# Patient Record
(demographics unavailable — no encounter records)

---

## 2017-03-03 NOTE — ED PEDIATRIC ILLNESS
HPI-Pediatric Illness


General


Chief Complaint:  Fever-Adult/Adol


Stated Complaint:  FEVER


Nursing Triage Note:  


MOM STATES CHILD HAS HAD FEVER SINCE MONDAY, WAS SEEN BY CHC YESTERDAY AND TOLD 


IF NO BETTER IN 24 HOUR TO COME TO ED.


Source:  patient


Exam Limitations:  no limitations


 (RHONA BRINK MD)





History of Present Illness


Time seen by provider:  20:56


Initial Comments


Here with report of fever over the last few days as well as moderate runny nose 

and cough.  Mother was concerned because the child hasn't eaten much over the 

last 2 days.  Child is drinking well.  Also has broken blood vessel on the 

lower portion of the left eye that they're concerned about.  Seen at the clinic 

yesterday.  Had one episode of vomiting yesterday but none today.  Child is 

drinking a moderate amount of fluid.  Has had 2 wet diapers today.


Timing/Duration:  changing over time, other (4 days)


Severity:  moderate


Associated Symptoms:   eating less fussy


Presenting Symptoms:   fever runny nose persistent cough vomitingNo skin rash (

RHONA BRINK MD)





Allergies and Home Medications


Allergies


Coded Allergies:  


     No Known Drug Allergies (Unverified , 14)





Home Medications


Cetirizine HCl 1 Mg/1 Ml Solution #75 (Reported) 


Ondansetron 4 Mg Tab.rapdis #5 2 MG PO Q6H PRN PRN NAUSEA/VOMITING 


   Prescribed by: OTONIEL GARCIA on 3/3/17 2234





Constitutional:   see HPINo chills, No fever


EENTM:   nose congestion


Respiratory:   coughNo short of breath


Cardiovascular:   no symptoms reported


Gastrointestinal:  No abdominal pain, No diarrhea,  vomiting


Genitourinary:   see HPI


Musculoskeletal:   no symptoms reported


Skin:   no symptoms reportedNo rash (RHONA BRINK MD)





All Other Systems Reviewed


Negative Unless Noted:  Yes


 (RHONA BRINK MD)





PMH-Pediatrics


Recent Foreign Travel:  No


Contact w/other who traveled:  No


Recent Infectious Disease Expo:  No


Hospitalization with Isolation:  Denies


 (RHONA BRINK MD)


Seasonal Allergies:  No


 (RHONA BRINK MD)


HX Surgeries:  No


 (RHONA BRIKN MD)


Hx Respiratory Disorders:  No


 (RHONA BRINK MD)


Hx Cardiovascular Disorders:  No


 (RHONA BRINK MD)


Hx Neurological Disorders:  No


 (RHONA BRINK MD)


Hx Reproductive Disorders:  No


 (RHONA BRINK MD)


Hx Genitourinary Disorders:  No


 (RHONA BRINK MD)


Hx Gastrointestinal Disorders:  No


 (RHONA BRINK MD)


Hx Musculoskeletal Disorders:  No


 (RHONA BRINK MD)


Hx Endocrine Disorders:  No


 (RHONA BRINK MD)


HX ENT Disorders:  No


 (RHONA BRINK MD)


Hx Cancer:  No


 (RHONA BRINK MD)


Hx Psychiatric Problems:  No


 (RHONA BRINK MD)


HX Skin/Integumentary Disorder:  No


 (RHONA BRINK MD)


Hx Blood Disorders:  No


 (RHONA BRINK MD)


Reviewed/Agree w Nursing PMH:  Yes


 (RHONA BRINK MD)


Significant Family History:  No Pertinent Family Hx


 (RHONA BRINK MD)





Physical Exam-Pediatric


Physical Exam


Vital Signs





 Vital Sign - Last 12Hours








 3/3/17 3/3/17





 19:27 22:55


 


Temp 101.3 


 


Pulse 162 


 


Resp 28 


 


B/P 0/0 


 


Pulse Ox  99





 (OTONIEL GARCIA)


Vital Signs


Capillary Refill :  


 (RHONA BRINK MD)


General Appearance:  no acute distress, fussy


General Appearance-Infants:  nml consolability


HENT:   TM redNo TM bulging, No loss of TM landmarks,  nasal congestion 

rhinorrhea


Neck:   full range of motion supple normal inspection


Respiratory:   lungs clear normal breath sounds


Cardiovascular:   no murmur tachycardia


Gastrointestinal:   non tender soft


Extremities:   non-tender normal inspection


Neurologic/Psychiatric:   alert oriented x 3


Skin:   normal color warm/dry (RHONA BRINK MD)





Progress/Results/Core Measures


Results/Orders


Micro Results





 Microbiology


3/3/17 Influenza Types A,B Antigen (ЕКАТЕРИНА) - Final, Complete


         


3/3/17 Respiratory Syncytial Virus Ag - Final, Complete


         


 (OTONIEL GARCIA)


Medications Given in ED


 (OTONIEL GARCIA)


Vital Signs/I&O





 Vital Sign - Last 12Hours








 3/3/17 3/3/17





 19:27 22:55


 


Temp 101.3 99.4


 


Pulse 162 112


 


Resp 28 28


 


B/P 0/0 


 


Pulse Ox  99





 (OTONIEL GARCIA)


Progress Note :  


Progress Note


Seen and evaluated.  RSV screen done.  This was positive.  Influenza negative.  

Child given ibuprofen and Tylenol.  She is tolerating by mouth fluids.  I did 

discuss with the mother related to need for fluids but mother states that she 

has been drinking very well.  We will avoid IV currently but I will get chest x-

ray given the length of time of symptoms.  Mother was in agreement of that.  

Child is drinking fluids currently.  Monitor patient.


 (RHONA BRINK MD)





Diagnostic Imaging





   Diagonstic Imaging:  Xray


   Plain Films/CT/US/NM/MRI:  chest


Comments


 [facility].


 [place], [place]





NAME:   DAINA ARROYO


MED REC#:   [id number]


ACCOUNT#:   [id number]


PT STATUS:   REG ER


:   2014


PHYSICIAN:   RHONA BRINK MD


ADMIT DATE:   17/ER


 ***Draft***


Date of Exam:17





CHEST PA/LAT (2 VIEW)














INDICATION: Fever.





COMPARISON: None.





EXAMINATION: Frontal and lateral views of the chest were


obtained.





FINDINGS: Normal heart size and pulmonary vascularity. The lungs


are clear. There are no signs of infiltrate, pleural effusions


or pneumothoraces. The visualized osseous structures show no


acute abnormalities.





IMPRESSION: No acute process. No signs of infiltrates, effusions


or pneumothoraces.





Dictated on workstation # [id number]








Dict:   175


Trans:   17


[facility] [id number]





Interpreted by:     TRINO MONTEZ


Electronically signed by:  





 (RHONA BRINK MD)





Departure


Impression


Impression:  


 Primary Impression:  


 RSV infection


Disposition:   HOME, SELF-CARE


Condition:  Improved





Departure-Patient Inst.


Decision time for Depature:  22:33


 (OTNOIEL GARCIA)


Referrals:  


FLORESITA PEARSON MD (PCP/Family)


Primary Care Physician


Patient Instructions:  Bronchiolitis (and RSV), Nausea and Vomiting, Child (DC)





Add. Discharge Instructions:


All discharge instructions reviewed with patient and/or family. Voiced 

understanding.  Medications as instructed.  Tylenol and ibuprofen over-the-

counter as directed based on weight/age.  Push fluids including clear liquids, 

popsicles, Jell-O, broth, Sprite/7-Up, etc.  Over-the-counter saline nasal 

spray if needed for nasal congestion.  Follow-up with your pediatrician for a 

recheck as an outpatient, call for appointment time Monday morning.  Return to 

the emergency department for worsened fever, decreased urination, vomiting, 

shortness of air, difficulty swallowing, or any other concerns.


Scripts


Ondansetron (Ondansetron Odt)4 Mg Tab.rapdis2 Mg PO Q6H PRN NAUSEA/VOMITING #5 

TAB  Ref 0


   Prov:OTONIEL GARCIA         3/3/17








RHONA BRINK MD Mar 3, 2017 21:14


OTONIEL GARCIA Mar 3, 2017 22:36


the emergency department for worsened fever, decreased urination, vomiting, 

shortness of air, difficulty swallowing, or any other concerns.


Scripts


Ondansetron (Ondansetron Odt)4 Mg Tab.rapdis2 Mg PO Q6H PRN NAUSEA/VOMITING #5 

TAB  Ref 0


   Prov:OTONIEL GARCIA         3/3/17








RHONA BRINK MD Mar 3, 2017 21:14


OTONIEL GARCIA Mar 3, 2017 22:36

## 2017-03-03 NOTE — XMS REPORT
Continuity of Care Document

 Created on: 10/31/2016



DAINA ARROYO

External Reference #: [id number]

: 2014

Sex: Female



Demographics







 Address  [address] [place]  [address]

 

 Home Phone  [phone]

 

 Preferred Language  English

 

 Marital Status  Unknown

 

 Roman [employer] Affiliation  Unknown

 

 Race  Unknown

 

 Ethnic Group  Unknown





Author







 Author  [facility]

 

 Organization  [facility]

 

 Address  Unknown

 

 Phone  Unavailable







Support







 Name  Relationship  Address  Phone

 

 FLORESITA PEARSON MD  Caregiver  [address] [place]  [address] [phone]

 

 JOYCE EMMIE FRAUSTO   Caregiver  [facility] EMERGENCY DEPT

[place], [place]  [address] [phone]

 

 AMEENA PASTRANA  Next Of Kin  [address] [place]  [address] [phone]







Care Team Providers







 Care Team Member Name  Role  Phone

 

 FLORESITA PEARSON MD  PCP  [phone]







Insurance Providers







 Payer Name  Policy Number  Subscriber Name  Relationship

 

 Self [employer] [facility]  [id number]  Daina Arroyo  18 Self / Same As 
Patient







Advance Directives







 Directive  Response  Recorded Date/Time

 

 Advance Directives  No  10/30/16 9:50pm

 

 Health Care Power of   No  10/30/16 9:50pm

 

 Organ Donor  No  10/30/16 9:50pm

 

 Resuscitation Status  Full Code  10/30/16 9:50pm







Chief Complaint and Reason for Visit







 Chief Complaint  Pediatric Illness/Problems

 

 Reason for Visit  Gastroenteritis







Problems

Active Problems







 Medical Problem  Onset Date  Status

 

 Fever  Unknown  Acute

 

 Gastroenteritis  Unknown  Acute

 

 Gastroenteritis  Unknown  Acute

 

 Influenza A  Unknown  Acute

 

 Influenza A  Unknown  Acute

 

 Left otitis media  Unknown  Acute

 

 Nausea and vomiting  Unknown  Acute

 

 Right otitis media  Unknown  Acute

 

 Upper respiratory infection  Unknown  Acute







Medications

Current Home Medications







 Medication  Dose  Units  Route  Directions  Days/Qty  Instructions  Start Date

 

 Ondansetron 4 Mg  2  Mg  Oral  Every 4HRS for Nausea/Vomiting  4     10/30/16





Past Home Medications







 Medication  Directions  Ordered  Status

 

 Ondansetron Hcl 4 Mg Tab, 2 Mg Sublingual  Every 4HRS  05/23/15  Discontinued

 

 Amoxicillin Trihydrate 125 Mg/5 Ml Susp.recon, 3 Ml Oral  Twice A Day  06/01/
15  Discontinued

 

 Lactobacillus Combo No.12 1 Each Powd.pack, 0.5 Each Oral  Daily  06/01/15  
Discontinued

 

 Amoxicillin 250 Mg/5 Ml Susp, 3 Ml Oral  Three Times A Day  12/22/15  
Discontinued







Social History







 Social History Problem  Response  Recorded Date/Time

 

 Alcohol Use  Denies Use  2016 8:51pm

 

 Recreational Drug Use  No  2016 8:51pm

 

 Recent Foreign Travel  No  10/30/2016 9:50pm

 

 Hospitalization with Isolation  Denies  10/30/2016 9:50pm

 

 Smoking Status  Never a Smoker  10/30/2016 9:50pm

 

 Recent Hopitalizations  No  10/30/2016 9:50pm

 

 Hospitalization with Isolation  Denies  10/30/2016 9:50pm









 Query  Response  Start Date  Stop Date

 

 Smoking Status  Never a Smoker      







Hospital Discharge Instructions

No hospital discharge instructions.



Plan of Care







 Discharge Date  10/30/16 11:51pm

 

 Disposition  01 HOME, SELF-CARE

 

 Condition at Discharge  Improved

 

 Instructions/Education Provided  Fever in Children (ED)

Gastroenteritis in Children (ED)

 

 Prescriptions  See Medication Section

 

 Referrals  FLORESITA PEARSON MD - Primary Care Physician

 

 Additional Instructions/Education  CLEAR LIQUIDS, SIPS AT A TIME--WATER, BROTH
, JELLO, PEDIALYTE  

  

TOMORROW IF CHILD IS BETTER, ADD BRATS DIET TO CLEAR LIQUIDS--BANANAS, RICE, 

APPLESAUCE, TOAST, SALTINES  

  

ACIDOPHILUS 1 PACKET 4 TIMES A DAY X 1 WEEK  

  

ALTERNATE TYLENOL AND MOTRIN EVERY 2-3 HOURS AS NEEDED FOR PAIN OR FEVER  

  

FOLLOW UP WITH YOUR DR TOMORROW FOR FURTHER CARE  

  

All discharge instructions reviewed with patient and/or family. Voiced 

understanding.







Functional Status

No functional status results.



Allergies, Adverse Reactions, Alerts

No known allergies.



Immunizations

No immunization records.



Vital Signs

Acute Vital Signs





 Vital  Response  Date/Time

 

 Temperature (Fahrenheit)  99.8 degrees F (97.6 - 99.5)  10/30/2016 9:50pm

 

 Temperature (Calculated Celsius)  37.98060 degrees C (36.4 - 37.5)  10/30/2016 
9:50pm

 

 Temperature Source  Temporal  10/30/2016 9:50pm

 

 Respiratory Rate (Toddler 1-3yrs)  20 bpm (20 - 40)  10/30/2016 9:50pm

 

 Blood Pressure  /   

 

    Blood Pressure Systolic (Toddler 1-3yrs)  107 mm Hg (96 - 99)  10/30/2016 9:
50pm

 

    Blood Pressure Diastolic (Toddler 1-3ys)  80 mm Hg (60 - 65)  10/30/2016 9:
50pm

 

 Pain      

 

 Height (Feet)  2 feet  10/30/2016 9:50pm

 

 Height (Inches)  10 inches  10/30/2016 9:50pm

 

 Height (Calculated Centimeters)  86.902994 cm  10/30/2016 9:50pm

 

 Weight (Pounds)  32 pounds  10/30/2016 9:50pm

 

 Weight (Calculated Grams)  10291.96 gm  10/30/2016 9:50pm

 

 Weight (Calculated Kilograms)  14.613166 kilograms  10/30/2016 9:50pm

 

 Calculated BMI  19.46  10/30/2016 9:50pm







Results

No known relevant diagnostic tests, laboratory data and/or discharge summary.



Procedures

No known history of procedures.



Encounters







 Encounter  Location  Arrival/Admit Date  Discharge/Depart Date  Attending 
Provider

 

 Departed Emergency Room  [facility]  10/30/16 9:39pm  10/30
/16 11:51pm  EMMIE COOK DO











 Recent Diagnosis

## 2017-03-03 NOTE — DIAGNOSTIC IMAGING REPORT
INDICATION: Fever.



COMPARISON: None.



EXAMINATION: Frontal and lateral views of the chest were

obtained.



FINDINGS: Normal heart size and pulmonary vascularity. The lungs

are clear. There are no signs of infiltrate, pleural effusions

or pneumothoraces. The visualized osseous structures show no

acute abnormalities.



IMPRESSION: No acute process. No signs of infiltrates, effusions

or pneumothoraces.



Dictated by:



Dictated on workstation # [id number]

## 2018-02-24 NOTE — ED INTEGUMENTARY GENERAL
General


Stated Complaint:  POSS BITE ON R ARM


Source:  patient, family (father and sibling)


Exam Limitations:  no limitations





History of Present Illness


Date Seen by Provider:  Feb 24, 2018


Time Seen by Provider:  09:50


Initial Comments


Patient presents to the ER by private conveyance with a chief complaint of for 

one day that hasprogressively growing lesion on her right palmar side wrist 

distally. He says that he is clean it with some soap and water couple times 

with acute growing. Does not seem to be very painful and she does not have any 

fevers, chills, other rash, history of trauma to that area, nausea and vomiting 

or history of abscesses. The wound is draining only a very small amount of 

fluid he says.





Allergies and Home Medications


Allergies


Coded Allergies:  


     No Known Drug Allergies (Unverified , 8/19/14)





Home Medications


Ondansetron 4 Mg Tab.rapdis, 2 MG PO Q6H PRN for NAUSEA/VOMITING


   Prescribed by: TOONIEL GARCIA on 3/3/17 2234





Constitutional:  No chills, No diaphoresis


EENTM:  No ear discharge, No ear pain


Respiratory:  No cough, No short of breath


Cardiovascular:  No Hx of Intervention


Gastrointestinal:  No constipation, No diarrhea, No nausea


Skin:  see HPI





Past Medical-Social-Family Hx


Patient Social History


Alcohol Use:  Denies Use


Recreational Drug Use:  No


Smoking Status:  Never a Smoker


2nd Hand Smoke Exposure:  No


Recent Foreign Travel:  No


Contact w/Someone Who Travel:  No


Recent Hopitalizations:  No





Immunizations Up To Date


PED Vaccines UTD:  Yes





Seasonal Allergies


Seasonal Allergies:  No





Reproductive System


Hx Reproductive Disorders:  No





Family Medical History


Significant Family History:  No Pertinent Family Hx





Physical Exam


Vital Signs


Capillary Refill :


General Appearance:  WD/WN, no apparent distress


HEENT:  PERRL/EOMI, pharynx normal


Neck:  non-tender, normal inspection


Cardiovascular:  normal peripheral pulses, regular rate, rhythm


Respiratory:  chest non-tender, lungs clear, normal breath sounds, no 

respiratory distress, no accessory muscle use


Neurologic/Psychiatric:  alert, normal mood/affect


Skin:  other (small round 1 cm lesion with some erythema, a cellular debris and 

no induration or area of fluctuation palpable underneath. Mildly tender. No 

other bruising or scars noted.)





Progress/Results/Core Measures


Progress Note :  


   Time:  09:59


Progress Note


Wound could be consistent with a draining abscess. Could be from previous 

injury or insect bite. There does not seem to be anything to drain 2 days. 

Clean it with chlorhexidine soap water remove the current eschar and I put her 

on some Bactrim. There is no other evidence of any other previous significant 

bruising, scars, other injury that might indicate this would be an intentional 

trauma. Child is not abnormally fearful and the parent sought care at a 

reasonable time. Neither parent smokes.





Departure


Impression


Impression:  


 Primary Impression:  


 Abscess of wrist


Disposition:  01 HOME, SELF-CARE


Condition:  Stable





Departure-Patient Inst.


Decision time for Depature:  10:07


Referrals:  


FLORESITA PEARSON MD (PCP/Family)


Primary Care Physician


Patient Instructions:  ABSCESS





Add. Discharge Instructions:  


Keep the wound clean throughout the day by using just regular soap and water. 

Do not use antiseptic such as alcohol, iodine or hydrogen peroxide as this will 

slow wound healing by killing her own healing cells as well as bacteria. Pick 

up the antibiotics and take Bactrim 10 milliliters by mouth twice a day for 5 

days. If she is starts having fevers despite being on antibiotics for 2-3 days 

or the wound seems to be getting worse follow-up with pediatrician or return to 

care for further evaluation and management of the wound.


Scripts


Sulfamethoxazole/Trimethoprim (Sulfamethoxazole-Tmp Susp 200MG/40MG/5ML) 20 Ml 

Oral.susp


10 ML PO BID for 5 Days, #100 ML 0 Refills


   Prov: NAY HENRY         2/24/18





Copy


Copies To 1:   ZAINAB TURCIOS TITUS J Feb 24, 2018 10:03

## 2018-02-24 NOTE — XMS REPORT
Continuity of Care Document

 Created on: 2018



DAINA ARROYO

External Reference #: [id number]

: 2014

Sex: Female



Demographics







 Address  [address] [place]  [address]

 

 Home Phone  [phone] x

 

 Preferred Language  Unknown

 

 Marital Status  Unknown

 

 Denominational Affiliation  Unknown

 

 Race  Unknown

 

 Ethnic Group  Unknown





Author







 Author  [facility]

 

 Address  Unknown

 

 Phone  Unavailable



              



Allergies

      





 Active            Description            Code            Type            
Severity            Reaction            Onset            Reported/Identified   
         Relationship to Patient            Clinical Status        

 

 Yes            No Known Drug Allergies            [id number]            Drug 
Allergy            Unknown            N/A                         2014   
                               



                  



Medications

      



There is no data.                  



Problems

      





 Date Dx Coded            Attending            Type            Code            
Diagnosis            Diagnosed By        

 

 2014            FLORESITA PEARSON MD                         V20.2       
     WELL BABY                     

 

 2014            FLORESITA PEARSON MD                         V20.2       
     WELL BABY                     

 

 2014            FLORESITA PEARSON MD                         V20.2       
     WELL BABY                     

 

 2014            FLORESITA PEARSON MD                         V20.2       
     WELL BABY                     

 

 2014            FLORESITA PEARSON MD                         V20.2       
     WELL BABY                     

 

 2014            RICHARD MOSQUEDA                         V20.2      
      WELL BABY                     

 

 2014            FLORESITA PEARSON MD                         V03.81      
      HIB (PEDVAX) DX                     

 

 2014            FLORESITA PEARSON MD                         V03.82      
      PCV-13 (PREVNAR) DX                     

 

 2014            FLORESITA PEARSON MD                         V04.89      
      ROTATEQ DX                     

 

 2014            FLORESITA PEARSON MD                         V06.8       
     PEDIARIX DX                     

 

 2014            RICHARD MOSQUEDA                         V03.81     
       HIB (PEDVAX) DX                     

 

 2014            RICHARD MOSQUEDA                         V03.82     
       PCV-13 (PREVNAR) DX                     

 

 2014            RICHARD MOSQUEDA                         V04.89     
       ROTATEQ DX                     

 

 2014            RICHARD MOSQUEDA                         V06.8      
      PEDIARIX DX                     

 

 2014            RICHARD MOSQUEDA                         535.50     
       GASTRITIS UNSPEC                     

 

 2014            [address]            Ot            487.1      
      FLU W RESP MANIFEST NEC                     

 

 2014            [address]            Ot            780.60     
       FEVER, UNSPECIFIED                     

 

 2015                         Ot            780.60            FEVER, 
UNSPECIFIED                     

 

 2015                         Ot            787.01            NAUSEA WITH 
VOMITING                     

 

 2015            JOYCE DO, EMMIE K            Ot            558.9          
  NONINF GASTROENTERIT NEC                     

 

 2015            JOYCE , EMMIE K            Ot            787.91         
   DIARRHEA                     

 

 2015            [address] APRN            Ot            382.9      
      OTITIS MEDIA NOS                     

 

 2015            [address] APRN            Ot            780.60     
       FEVER, UNSPECIFIED                     

 

 2015            FELIPE KITCHEN, TIMOTHY STROUD            Ot            H66.91      
      OTITIS MEDIA, UNSPECIFIED, RIGHT EAR                     

 

 2015            FELIPE KITCHEN, TIMOTHY A            Ot            R09.81      
      NASAL CONGESTION                     

 

 2015            FELIPE KITCHEN, TIMOTHY A            Ot            R50.9       
     FEVER, UNSPECIFIED                     

 

 2016            TRENA KITCHEN, SHEELA TORRES            Ot            R11.10
            VOMITING, UNSPECIFIED                     

 

 2016            TRENA KITCHEN, SHEELA TORRES            Ot            R50.9 
           FEVER, UNSPECIFIED                     

 

 2016            TRENA KITCHEN, SHEELA TORRES            Ot            Z53.21
            PROC/TRTMT NOT CRD OUT D/T PT LV BEF SEE                     

 

 2016            TRENA KITCHEN, SHEELA TORRES            Ot            R11.10
            VOMITING, UNSPECIFIED                     

 

 2016            TRENA KITCHEN, SHEELA TORRES            Ot            R50.9 
           FEVER, UNSPECIFIED                     

 

 2016            TRENA KITCHEN, SHEELA TORRES            Ot            Z53.21
            PROC/TRTMT NOT CRD OUT D/T PT LV BEF SEE                     

 

 2016            TRENA KITCHEN, SHEELA TORRES            Ot            R11.2 
           NAUSEA WITH VOMITING, UNSPECIFIED                     

 

 10/30/2016            JOYCE DO, EMMIE K            Ot            K52.9          
  NONINFECTIVE GASTROENTERITIS AND COLITIS                     

 

 10/30/2016            JOYCE DO, EMMIE K            Ot            R11.2          
  NAUSEA WITH VOMITING, UNSPECIFIED                     

 

 10/30/2016            JOYCE DO, EMMIE K            Ot            R50.9          
  FEVER, UNSPECIFIED                     

 

 2016            JOYCE DO, EMMIE K            Ot            K52.9          
  NONINFECTIVE GASTROENTERITIS AND COLITIS                     

 

 2016            JOYCE DO, EMMIE K            Ot            R11.2          
  NAUSEA WITH VOMITING, UNSPECIFIED                     

 

 2016            JOYCE DO EMMIE K            Ot            R50.9          
  FEVER, UNSPECIFIED                     

 

 2016            JOYCE , EMMIE K            Ot            S00.86XA       
     INSECT BITE (NONVENOMOUS) OF OTHER PART                      

 

 2016            JOYCE , EMMIE K            Ot            S40.261A       
     INSECT BITE (NONVENOMOUS) OF RIGHT SHOUL                     

 

 2016            JOYCE , EMMIE K            Ot            S50.861A       
     INSECT BITE (NONVENOMOUS) OF RIGHT FOREA                     

 

 2016            JOYCE , EMMIE K            Ot            W57.XXXA       
     BIT/STUNG BY NONVENOM INSECT   OTH NONVE                     

 

 2016            JOYCE RANDY BALDERRAMAA K            Ot            Y92.013        
    BEDROOM OF SINGLE-FAMILY (PRIVATE) HOUSE                     

 

 2016            Meraux , EMMIE K            Ot            Y93.84         
   ACTIVITY, SLEEPING                     

 

 2016            JOYCE , EMMIE K            Ot            Y99.8          
  OTHER EXTERNAL CAUSE STATUS                     

 

 2016            JOYCE , EMMIE K            Ot            S00.86XA       
     INSECT BITE (NONVENOMOUS) OF OTHER PART                      

 

 2016            JOYCE BALDERRAMA, EMMIE K            Ot            S40.261A       
     INSECT BITE (NONVENOMOUS) OF RIGHT SHOUL                     

 

 2016            JOYCE , EMMIE K            Ot            S50.861A       
     INSECT BITE (NONVENOMOUS) OF RIGHT FOREA                     

 

 2016            JOYCE , EMMIE K            Ot            W57.XXXA       
     BIT/STUNG BY NONVENOM INSECT   OTH NONVE                     

 

 2016            JOYCE , EMMIE K            Ot            Y92.013        
    BEDROOM OF SINGLE-FAMILY (PRIVATE) HOUSE                     

 

 2016            JOYCE , EMMIE K            Ot            Y93.84         
   ACTIVITY, SLEEPING                     

 

 2016            JOYCE , EMMIE K            Ot            Y99.8          
  OTHER EXTERNAL CAUSE STATUS                     

 

 2017            RHONA BRINK MD            Ot            B97.4   
         RESPIRATORY SYNCYTIAL VIRUS CAUSING DISE                     

 

 2017            RHONA BRINK MD            Ot            R05     
       COUGH                     

 

 2017            RHONA BRINK MD            Ot            R09.81  
          NASAL CONGESTION                     

 

 2017            RHONA BRINK MD            Ot            B97.4   
         RESPIRATORY SYNCYTIAL VIRUS CAUSING DISE                     

 

 2017            RHONA BRINK MD            Ot            R05     
       COUGH                     

 

 2017            RHONA BRINK MD            Ot            R09.81  
          NASAL CONGESTION                     



                                                                               
                                                       



Procedures

      



There is no data.                  



Results

      





 Test            Result            Range        









 Influenza virus A and B antigen detection - 03/03/17 19:33         









 FLU RESULT            NEGATIVE FOR INFLUENZA A AND B ANTIGENS BY IA           
  NRG        









 Respiratory syncytial virus antigen detection - 17 19:33         









 CALL POSITIVES (F1 HELP)            FRED             NRG        

 

 RSVRESULT            POSITIVE BY IMMUNOASSAY             NRG        



                  



Encounters

      





 ACCT No.            Visit Date/Time            Discharge            Status    
        Pt. Type            Provider            Facility            Loc./Unit  
          Complaint        

 

 [id number]            2014 12:24:00            2014 23:59:59          
  CLS            Outpatient            [facility]                    
                           

 

 [id number]            2014 10:45:00            2014 23:59:59          
  CLS            Outpatient            FLORESITA PEARSON MD                     
                          

 

 [id number]            2014 11:05:00            2014 23:59:59          
  CLS            Outpatient            FLORESITA PEARSON MD                     
                          

 

 [id number]            2014 09:11:00            2014 23:59:59          
  CLS            Outpatient            FLORESITA PEARSON MD                     
                          

 

 [id number]            2014 14:19:00            2014 23:59:59          
  CLS            Outpatient            FLORESITA PEARSON MD                     
                          

 

 [id number]            2014 09:56:00            2014 23:59:59          
  CLS            Outpatient            FLORESITA PEARSON MD                     
                          

 

 [id number]            2017 18:31:00            2017 22:55:00    
        DIS            Emergency            RHONA BRINK MD            
[facility]            ER            FEVER        

 

 [id number]            2016 00:50:00            2016 01:24:00    
        DIS            Emergency            JOYCE EMMIE BALDERRAMA            [facility]            ER            ALLERGIC RXN,FACE   ARM,
ITCHY        

 

 [id number]            10/30/2016 21:39:00            10/30/2016 23:51:00    
        DIS            Emergency            JOYCE EMMIE BALDERRAMA            [facility]            ER            FEVER, VOMITING        

 

 [id number]            2016 20:48:00            2016 21:24:00    
        DIS            Emergency            SHEELA ALBRECHT MD            
[facility]            ER                     

 

 [id number]            2016 19:24:00            2016 21:23:00    
        DIS            Emergency            SHEELA ALBRECHT MD            
[facility]            ER                     

 

 [id number]            2015 16:52:00            2015 17:38:00    
        DIS            Emergency            TIMOTHY WANG MD            [facility]            ER                     

 

 [id number]            2015 18:17:00            2015 18:57:00    
        DIS            Emergency            [address]            [facility] 
[facility]            ER                     

 

 [id number]            2015 12:42:00            2015 13:53:00    
        DIS            Emergency            EMMIE COOK DO            [facility]            ER                     

 

 [id number]            2014 14:24:00            2014 16:21:00    
        DIS            Emergency            [address]            [facility] 
[facility]            ER                     

 

 [id number]            2015 23:35:00                                   
   Document Registration                                                       
     

 

 [id number]            2014 22:13:00                         [facility]       
     Inpatient